# Patient Record
Sex: FEMALE | URBAN - METROPOLITAN AREA
[De-identification: names, ages, dates, MRNs, and addresses within clinical notes are randomized per-mention and may not be internally consistent; named-entity substitution may affect disease eponyms.]

---

## 2021-08-20 ENCOUNTER — PRE-OPERATIVE VISIT (OUTPATIENT)
Dept: URBAN - METROPOLITAN AREA CLINIC 4 | Facility: CLINIC | Age: 41
End: 2021-08-20

## 2021-08-20 PROCEDURE — 65760 KERATOMILEUSIS: CPT | Performed by: OPHTHALMOLOGY

## 2021-08-20 ASSESSMENT — KERATOMETRY
OS: 38.50
OD: 37.38

## 2021-08-20 ASSESSMENT — VISUAL ACUITY: OD: 20/20

## 2021-08-26 ENCOUNTER — POST-OPERATIVE VISIT (OUTPATIENT)
Dept: URBAN - METROPOLITAN AREA CLINIC 4 | Facility: CLINIC | Age: 41
End: 2021-08-26

## 2021-08-26 PROCEDURE — 99024 POSTOP FOLLOW-UP VISIT: CPT | Performed by: OPHTHALMOLOGY

## 2021-08-26 ASSESSMENT — INTRAOCULAR PRESSURE
OD: 17
OS: 16

## 2021-08-26 NOTE — IMPRESSION/PLAN
Impression: S/P PRK Touch up over LASIK OD - 6 Days. Encounter for surgical aftercare following surgery on a sense organ  Z48.810. Plan: POD#6 s/p PRK touch up over LASIK RT eye - healing well, contact lens removed. Start long PF1% taper. Cont. Vitamin C 1,000mg PO QD until PF 1% taper is completed, sunglasses protection and preservative free artificial tears every hour with taper of 1 hour per week. OK to use Systane gel drops QHS OU. F/U in 3 weeks, sooner PRN.

## 2021-09-20 ENCOUNTER — POST-OPERATIVE VISIT (OUTPATIENT)
Dept: URBAN - METROPOLITAN AREA CLINIC 4 | Facility: CLINIC | Age: 41
End: 2021-09-20

## 2021-09-20 PROCEDURE — 99024 POSTOP FOLLOW-UP VISIT: CPT

## 2021-09-20 ASSESSMENT — INTRAOCULAR PRESSURE
OS: 17
OD: 17

## 2021-09-20 NOTE — IMPRESSION/PLAN
Impression: S/P PRK touch up over LASIK OD - 31 Days. Encounter for surgical aftercare following surgery on a sense organ  Z48.810. Post operative instructions reviewed - Plan: POW#4 s/p PRK RT eye touch up  over LASIK- healing well, Use  PF 1%  BID x 1 week, then QD x 1 week then stop. Continue Vitamin C 1,000mg PO QD until completes the PF 1% taper. Cont. sunglasses protection and preservative free artificial tears QID. F/U in 1 month, sooner PRN.

## 2021-10-25 ENCOUNTER — POST-OPERATIVE VISIT (OUTPATIENT)
Dept: URBAN - METROPOLITAN AREA CLINIC 4 | Facility: CLINIC | Age: 41
End: 2021-10-25

## 2021-10-25 DIAGNOSIS — Z48.810 ENCOUNTER FOR SURGICAL AFTERCARE FOLLOWING SURGERY ON A SENSE ORGAN: Primary | ICD-10-CM

## 2021-10-25 PROCEDURE — 99024 POSTOP FOLLOW-UP VISIT: CPT

## 2021-10-25 ASSESSMENT — INTRAOCULAR PRESSURE
OD: 12
OS: 15

## 2021-10-25 NOTE — IMPRESSION/PLAN
Impression: S/P PRK Post OP OD - 66 Days. Encounter for surgical aftercare following surgery on a sense organ  Z48.810. Plan: POM#2 s/p PRK touchup RT eye- healing well, completed PF 1% taper. Stop Vitamin C. Cont. sunglasses protection and preservative free artificial tears QID. Add gel drop prn, pt. likes to use lub juli QHS. F/U in 2-3 months, as requested, or sooner PRN.

## 2022-01-26 ENCOUNTER — POST-OPERATIVE VISIT (OUTPATIENT)
Dept: URBAN - METROPOLITAN AREA CLINIC 4 | Facility: CLINIC | Age: 42
End: 2022-01-26

## 2022-01-26 PROCEDURE — 99024 POSTOP FOLLOW-UP VISIT: CPT | Performed by: OPTOMETRIST

## 2022-01-26 ASSESSMENT — KERATOMETRY
OD: 36.75
OS: 38.88

## 2022-01-26 NOTE — IMPRESSION/PLAN
Impression: S/P PRK Touch Up Post Operative OD - 159 Days. Encounter for surgical aftercare following surgery on a sense organ  Z48.810. Plan: POM#6 s/p LASIK both eyes - well healed, off Pred-Moxi, patient happy with results. Stop Vitamin C. Cont. sunglasses protection and preservative free artificial tears QID prn. May start gel artificial tears as needed. F/U at the anniversary of the surgery for a repeat dilated exam and final PO visit.

## 2022-08-29 ENCOUNTER — POST-OPERATIVE VISIT (OUTPATIENT)
Dept: URBAN - METROPOLITAN AREA CLINIC 4 | Facility: CLINIC | Age: 42
End: 2022-08-29

## 2022-08-29 DIAGNOSIS — Z48.810 ENCOUNTER FOR SURGICAL AFTERCARE FOLLOWING SURGERY ON A SENSE ORGAN: Primary | ICD-10-CM

## 2022-08-29 PROCEDURE — 99024 POSTOP FOLLOW-UP VISIT: CPT | Performed by: OPHTHALMOLOGY

## 2022-08-29 ASSESSMENT — INTRAOCULAR PRESSURE
OS: 19
OD: 19

## 2022-08-29 NOTE — IMPRESSION/PLAN
Impression: S/P PRK touch over LASIK  - 374 Days. Encounter for surgical aftercare following surgery on a sense organ  Z48.810. Plan: POY#1 s/p LASIK both eyes - well healed, patient happy with results. Cont. sunglasses protection and artificial tears prn.  F/U yearly for a repeat dilated exam.